# Patient Record
Sex: FEMALE
[De-identification: names, ages, dates, MRNs, and addresses within clinical notes are randomized per-mention and may not be internally consistent; named-entity substitution may affect disease eponyms.]

---

## 2023-09-11 ENCOUNTER — NURSE TRIAGE (OUTPATIENT)
Dept: OTHER | Facility: CLINIC | Age: 38
End: 2023-09-11

## 2023-09-11 NOTE — TELEPHONE ENCOUNTER
Location of patient: [place]    Subjective: Caller states \"I didn't know if it shows up what I went in for. They gave me these antibiotics. I just started taking them yesterday and this morning when I woke up I am so dizzy, I can't see straight or walk straight. It went away and then just came back. I am super nauseas. \"   Given antibiotic for anal/rectal abscess 2 days ago     Current Symptoms: dizziness, nausea     Associated Symptoms: NA    Pain Severity: 0/10; N/A; none    Temperature: denies    What has been tried: nothing    Recommended disposition: Go to ED Now    Care advice provided, patient verbalizes understanding; denies any other questions or concerns.     Outcome: Patient/caller agrees to proceed to nearest Emergency Department      This triage is a result of a call to the [address] [place] [address]    Reason for Disposition   SEVERE dizziness (e.g., unable to stand, requires support to walk, feels like passing out now)    Protocols used: Dizziness - Lightheadedness-ADULT-